# Patient Record
Sex: MALE | Race: BLACK OR AFRICAN AMERICAN | NOT HISPANIC OR LATINO | URBAN - METROPOLITAN AREA
[De-identification: names, ages, dates, MRNs, and addresses within clinical notes are randomized per-mention and may not be internally consistent; named-entity substitution may affect disease eponyms.]

---

## 2019-02-16 ENCOUNTER — EMERGENCY (EMERGENCY)
Facility: HOSPITAL | Age: 29
LOS: 1 days | Discharge: HOME | End: 2019-02-16
Attending: EMERGENCY MEDICINE
Payer: COMMERCIAL

## 2019-02-16 VITALS — WEIGHT: 139.99 LBS | HEIGHT: 69 IN

## 2019-02-16 VITALS
HEART RATE: 140 BPM | OXYGEN SATURATION: 99 % | TEMPERATURE: 98 F | RESPIRATION RATE: 18 BRPM | SYSTOLIC BLOOD PRESSURE: 141 MMHG | DIASTOLIC BLOOD PRESSURE: 92 MMHG

## 2019-02-16 DIAGNOSIS — F43.21 ADJUSTMENT DISORDER WITH DEPRESSED MOOD: ICD-10-CM

## 2019-02-16 DIAGNOSIS — R69 ILLNESS, UNSPECIFIED: ICD-10-CM

## 2019-02-16 PROCEDURE — 93010 ELECTROCARDIOGRAM REPORT: CPT

## 2019-02-16 PROCEDURE — 99284 EMERGENCY DEPT VISIT MOD MDM: CPT

## 2019-02-16 PROCEDURE — 93005 ELECTROCARDIOGRAM TRACING: CPT

## 2019-02-16 NOTE — ED ADULT NURSE NOTE - NSIMPLEMENTINTERV_GEN_ALL_ED
Implemented All Universal Safety Interventions:  [place] to call system. Call bell, personal items and telephone within reach. Instruct patient to call for assistance. Room bathroom lighting operational. Non-slip footwear when patient is off stretcher. Physically safe environment: no spills, clutter or unnecessary equipment. Stretcher in lowest position, wheels locked, appropriate side rails in place.

## 2019-02-16 NOTE — ED BEHAVIORAL HEALTH NOTE - BEHAVIORAL HEALTH NOTE
Social work note:  gave referrals to outpatient mental health treatment to pt. Resources included [facility], [employer], and other local services. No other social work needs at this time. Plan is for pt to return home with outpatient services.

## 2019-02-16 NOTE — ED BEHAVIORAL HEALTH ASSESSMENT NOTE - SUMMARY
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

## 2019-02-16 NOTE — ED STATDOCS - CLINICAL SUMMARY MEDICAL DECISION MAKING FREE TEXT BOX
27 y/o M Counts include 234 beds at the [facility] , c/o depressed behavior, will order EKG, if normal pt will be cleared to go to .

## 2019-02-16 NOTE — ED ADULT TRIAGE NOTE - CHIEF COMPLAINT QUOTE
Pt reports extreme sadness, anger, mood swings, and anxiety for several days. States stopped smoking several days ago and denies any psyciatric history, drugs, or alcohol use. Denies SI/HI. Pt appearing very agitated and anxious during triage and is currently tearful.

## 2019-02-16 NOTE — ED ADULT NURSE NOTE - SUICIDE PROTECTIVE FACTORS
Supportive social network or family/Responsibility to family and others/Positive therapeutic relationships/Future oriented/Identifies reasons for living

## 2019-02-16 NOTE — ED ADULT NURSE NOTE - HPI (INCLUDE ILLNESS QUALITY, SEVERITY, DURATION, TIMING, CONTEXT, MODIFYING FACTORS, ASSOCIATED SIGNS AND SYMPTOMS)
Patient comes to -ED after his parents brought him here for evaluation for possible depression. Patient comes to -ED after his parents brought him here for evaluation for depression. He reports he has been a  ([facility]) for about 7 months, and he feels "extremely sad" because he feels like he has to be "perfect" every day he goes to work. Patient states he has a 7 y/o daughter and he has to commute an hour each way for his job, and feels like he isn't "there for her anymore." He denied suicidal ideation, states emphatically that he loves life, and wants to "stay alive as long as possible". Pt lives with his parents, significant other, and his daughter, and feels like he has a good support system. Admits he has "not gotten out of bed for a couple of days", but doesn't feel like he "can't function". Patient initially stated he didn't want to talk to anyone, and wanted to just go home. After speaking to RN and psychiatrist, pt accepted info about seeing a therapist or counselor. Pt reports he has never been on medication, and he has no desire to take any medications at this time. Patient mood and affect improved after speaking with RN/MD. MD spoke to patient's parents for collateral, and they stated they felt it was safe to d/c patient. Patient provided community resource sheet and other info to follow up at pt's discretion.

## 2019-02-16 NOTE — ED STATDOCS - CONSTITUTIONAL, MLM
normal... well appearing, well nourished, and in no apparent distress. Pt with bowed head, depressed affect and in no apparent distress. No SI/HI

## 2019-02-16 NOTE — ED STATDOCS - OBJECTIVE STATEMENT
29 y/o M pt with hx of smoking presents to ED mother who states son recently started job as a [place]  7 months ago with gradual presentation of feeling depressed, intermittent aggressive behavior, repeating words and grinding his teeth. Pt states he is unhappy, sad, not wanting to be here, but denies any plans of harming himself or others. Denies ever having seen a psychiatrist, hx of depression, on any meds, prior hospitalizations, drug or EtOH abuse, N/V/D, fever, CP, SOB, difficulty breathing, SI or HI.

## 2020-11-23 NOTE — ED BEHAVIORAL HEALTH ASSESSMENT NOTE - HPI (INCLUDE ILLNESS QUALITY, SEVERITY, DURATION, TIMING, CONTEXT, MODIFYING FACTORS, ASSOCIATED SIGNS AND SYMPTOMS)
LM on  to call this RN back if he should call the main clinic please forward to me [phone]  Bernice Rader RN     precipitating factor was last week a threat of command discipline for policy violation which ultimately was dismissed He went o shut down during days off Spent time alone in room brooding limited interaction with family Mom shares he  was always self absorbed and not one to share emotionally dad is perplexed over his stress at work since he talked for years about being a    Patient is self described as frustrated angry stressed "I need to prove myself everyday"  Denies any dangerous symptoms parents have no safety concerns  associated symptoms include irritability poor sleep negativity social withdrawal  at baseline had few friends and limited social outlets

## 2021-08-12 NOTE — ED BEHAVIORAL HEALTH ASSESSMENT NOTE - HYGIENE
1 Principal Discharge DX:	 labor with delivery   Good Principal Discharge DX:	 labor with delivery  Assessment and plan of treatment:	routine care